# Patient Record
Sex: FEMALE | Race: WHITE | NOT HISPANIC OR LATINO | Employment: FULL TIME | URBAN - METROPOLITAN AREA
[De-identification: names, ages, dates, MRNs, and addresses within clinical notes are randomized per-mention and may not be internally consistent; named-entity substitution may affect disease eponyms.]

---

## 2023-08-17 ENCOUNTER — OFFICE VISIT (OUTPATIENT)
Dept: PRIMARY CARE CLINIC | Facility: CLINIC | Age: 48
End: 2023-08-17
Payer: OTHER GOVERNMENT

## 2023-08-17 VITALS
HEIGHT: 67 IN | SYSTOLIC BLOOD PRESSURE: 119 MMHG | DIASTOLIC BLOOD PRESSURE: 81 MMHG | WEIGHT: 244 LBS | HEART RATE: 75 BPM | OXYGEN SATURATION: 97 % | BODY MASS INDEX: 38.3 KG/M2 | RESPIRATION RATE: 18 BRPM

## 2023-08-17 DIAGNOSIS — N94.10 DYSPAREUNIA, FEMALE: Primary | ICD-10-CM

## 2023-08-17 DIAGNOSIS — N95.2 ATROPHIC VAGINITIS: ICD-10-CM

## 2023-08-17 PROCEDURE — 99203 OFFICE O/P NEW LOW 30 MIN: CPT | Mod: ,,, | Performed by: STUDENT IN AN ORGANIZED HEALTH CARE EDUCATION/TRAINING PROGRAM

## 2023-08-17 PROCEDURE — 99203 PR OFFICE/OUTPT VISIT, NEW, LEVL III, 30-44 MIN: ICD-10-PCS | Mod: ,,, | Performed by: STUDENT IN AN ORGANIZED HEALTH CARE EDUCATION/TRAINING PROGRAM

## 2023-08-17 RX ORDER — IBUPROFEN 800 MG/1
800 TABLET ORAL 2 TIMES DAILY PRN
COMMUNITY
Start: 2023-08-14

## 2023-08-17 RX ORDER — HYDROCHLOROTHIAZIDE 25 MG/1
25 TABLET ORAL DAILY
COMMUNITY

## 2023-08-17 RX ORDER — LISINOPRIL 40 MG/1
1 TABLET ORAL DAILY
COMMUNITY
Start: 2023-08-14

## 2023-08-17 RX ORDER — CETIRIZINE HYDROCHLORIDE 10 MG/1
10 TABLET ORAL DAILY
COMMUNITY

## 2023-08-17 RX ORDER — MONTELUKAST SODIUM 10 MG/1
10 TABLET ORAL NIGHTLY
COMMUNITY
End: 2023-10-31 | Stop reason: ALTCHOICE

## 2023-08-17 RX ORDER — EZETIMIBE 10 MG/1
10 TABLET ORAL DAILY
COMMUNITY
Start: 2023-08-14 | End: 2023-08-17 | Stop reason: ALTCHOICE

## 2023-08-17 RX ORDER — ASPIRIN 81 MG/1
81 TABLET ORAL DAILY
COMMUNITY

## 2023-08-17 RX ORDER — ESCITALOPRAM OXALATE 10 MG/1
10 TABLET ORAL DAILY
COMMUNITY
Start: 2023-08-14

## 2023-08-17 RX ORDER — CHOLECALCIFEROL (VITAMIN D3) 25 MCG
TABLET ORAL DAILY
COMMUNITY
Start: 2023-08-14

## 2023-08-17 RX ORDER — FENOFIBRATE 48 MG/1
48 TABLET, FILM COATED ORAL DAILY
COMMUNITY

## 2023-08-17 RX ORDER — ESTRADIOL 0.1 MG/G
CREAM VAGINAL
Qty: 42.5 G | Refills: 11 | Status: SHIPPED | OUTPATIENT
Start: 2023-08-17 | End: 2023-10-31 | Stop reason: SDUPTHER

## 2023-08-17 RX ORDER — PRAVASTATIN SODIUM 80 MG/1
40 TABLET ORAL NIGHTLY
COMMUNITY
Start: 2023-08-14

## 2023-08-17 NOTE — PROGRESS NOTES
Chief Complaint  Chief Complaint   Patient presents with    [facility]     [facility], would like a local MD     Dyspareunia     Has vaginal dryness, has tried lubrication without relief of pain, S/P hysterectomy for endometriosis, would like to discuss referral to GYN       HPI  Sandy Martinez is a 48 y.o. female with medical diagnoses as listed in the medical history and problem list that presents to [facility].   Histories reviewed. [facility].      and has 15 year old son   She just moved from [place] in 12/2022  Patient complains of worsening pain with sexual intercourse since total hysterectomy in 2018. She has not had intercourse x 7 months. Denies history of breast or uterine or cervical cancer. Patient uses lubricant as much as possible but it has not helped. (+) significant dryness and itch of the vaginal and vulvar area. Denies abnormal bleeding or discharge. Denies any lesions.   Health Maintenance         Date Due Completion Date    Hepatitis C Screening Never done ---    HIV Screening Never done ---    Hemoglobin A1c (Diabetic Prevention Screening) Never done ---    Colorectal Cancer Screening 12/01/2023 (Originally 1975) ---    Mammogram 12/01/2023 (Originally 5/18/1993) ---    Influenza Vaccine (1) 09/01/2023 ---    TETANUS VACCINE 04/14/2033 4/14/2023            ALLERGIES AND MEDICATIONS: updated and reviewed.  Review of patient's allergies indicates:  No Known Allergies  Current Outpatient Medications   Medication Sig Dispense Refill    aspirin (ECOTRIN) 81 MG EC tablet Take 81 mg by mouth once daily.      cetirizine (ZYRTEC) 10 MG tablet Take 10 mg by mouth once daily.      EScitalopram oxalate (LEXAPRO) 10 MG tablet Take 10 mg by mouth once daily.      fenofibrate (TRICOR) 48 MG tablet Take 48 mg by mouth once daily.      hydroCHLOROthiazide (HYDRODIURIL) 25 MG tablet Take 25 mg by mouth once  "daily.      ibuprofen (ADVIL,MOTRIN) 800 MG tablet Take 800 mg by mouth 2 (two) times daily as needed.      lisinopriL (PRINIVIL,ZESTRIL) 40 MG tablet Take 1 tablet by mouth once daily.      montelukast (SINGULAIR) 10 mg tablet Take 10 mg by mouth every evening.      pravastatin (PRAVACHOL) 80 MG tablet Take 40 mg by mouth every evening.      vitamin D (VITAMIN D3) 1000 units Tab Take by mouth once daily.      estradioL (ESTRACE) 0.01 % (0.1 mg/gram) vaginal cream Use 2g daily x 2 weeks then 1g daily x 2 weeks. Then 1g twice weekly. 42.5 g 11     No current facility-administered medications for this visit.       Histories are reviewed and updated as appropriate     Review of Systems  Comprehensive review of system performed- negative except noted in HPI       Objective:   Vitals:    08/17/23 1338   BP: 119/81   Pulse: 75   Resp: 18   SpO2: 97%   Weight: 110.7 kg (244 lb)   Height: 5' 7" (1.702 m)    Body mass index is 38.22 kg/m².  Physical Exam  Vitals and nursing note reviewed.   Constitutional:       General: She is not in acute distress.     Appearance: Normal appearance.   HENT:      Head: Normocephalic and atraumatic.      Mouth/Throat:      Mouth: Mucous membranes are moist.      Pharynx: Oropharynx is clear.   Eyes:      Extraocular Movements: Extraocular movements intact.      Conjunctiva/sclera: Conjunctivae normal.   Cardiovascular:      Rate and Rhythm: Normal rate and regular rhythm.   Pulmonary:      Effort: Pulmonary effort is normal.      Breath sounds: Normal breath sounds.   Abdominal:      General: There is no distension.   Musculoskeletal:         General: Normal range of motion.   Skin:     General: Skin is warm and dry.   Neurological:      General: No focal deficit present.      Mental Status: She is alert and oriented to person, place, and time. Mental status is at baseline.   Psychiatric:         Mood and Affect: Mood normal.         Behavior: Behavior normal.         Thought Content: " Thought content normal.         Judgment: Judgment normal.           Assessment & Plan  1. Dyspareunia, female  -     estradioL (ESTRACE) 0.01 % (0.1 mg/gram) vaginal cream; Use 2g daily x 2 weeks then 1g daily x 2 weeks. Then 1g twice weekly.  Dispense: 42.5 g; Refill: 11    2. Atrophic vaginitis  -     estradioL (ESTRACE) 0.01 % (0.1 mg/gram) vaginal cream; Use 2g daily x 2 weeks then 1g daily x 2 weeks. Then 1g twice weekly.  Dispense: 42.5 g; Refill: 11    Trial of topical estrogen cream at this time. Side effects discussed with patient.  Discuss the need of GYN referral if patient needs further treatment.,   Awaiting for records from prior PCP.

## 2023-09-13 ENCOUNTER — PATIENT MESSAGE (OUTPATIENT)
Dept: PRIMARY CARE CLINIC | Facility: CLINIC | Age: 48
End: 2023-09-13
Payer: OTHER GOVERNMENT

## 2023-09-13 ENCOUNTER — TELEPHONE (OUTPATIENT)
Dept: PRIMARY CARE CLINIC | Facility: CLINIC | Age: 48
End: 2023-09-13
Payer: OTHER GOVERNMENT

## 2023-09-13 NOTE — TELEPHONE ENCOUNTER
----- Message from Jyothi Combs sent at 9/13/2023  9:21 AM CDT -----  Regarding: Referral  .Type:  Patient Requesting Referral    Who Called:Joycelyn Louie   Does the patient already have the specialty appointment scheduled?:no  If yes, what is the date of that appointment?:  Referral to What Specialty:  Reason for Referral:  Does the patient want the referral with a specific physician?:  Is the specialist an [facility]?:  Patient Requesting a Response?:  Would the patient rather a call back or a response via [facility]?   Best Call Back Number:[phone]  Additional Information: Unable to contact pt

## 2023-09-13 NOTE — TELEPHONE ENCOUNTER
Noted, referral management updated. I also sent patient a message via portal with phone number to [facility].

## 2023-10-31 ENCOUNTER — OFFICE VISIT (OUTPATIENT)
Dept: PRIMARY CARE CLINIC | Facility: CLINIC | Age: 48
End: 2023-10-31
Payer: OTHER GOVERNMENT

## 2023-10-31 VITALS
BODY MASS INDEX: 38.45 KG/M2 | TEMPERATURE: 98 F | OXYGEN SATURATION: 97 % | RESPIRATION RATE: 18 BRPM | DIASTOLIC BLOOD PRESSURE: 84 MMHG | WEIGHT: 245 LBS | HEIGHT: 67 IN | SYSTOLIC BLOOD PRESSURE: 130 MMHG | HEART RATE: 81 BPM

## 2023-10-31 DIAGNOSIS — R50.9 FEVER, UNSPECIFIED FEVER CAUSE: ICD-10-CM

## 2023-10-31 DIAGNOSIS — R52 BODY ACHES: ICD-10-CM

## 2023-10-31 DIAGNOSIS — N95.2 ATROPHIC VAGINITIS: ICD-10-CM

## 2023-10-31 DIAGNOSIS — J06.9 UPPER RESPIRATORY TRACT INFECTION, UNSPECIFIED TYPE: Primary | ICD-10-CM

## 2023-10-31 DIAGNOSIS — J02.9 SORETHROAT: ICD-10-CM

## 2023-10-31 DIAGNOSIS — N94.10 DYSPAREUNIA, FEMALE: ICD-10-CM

## 2023-10-31 LAB
CTP QC/QA: YES
CTP QC/QA: YES
POC MOLECULAR INFLUENZA A AGN: NEGATIVE
POC MOLECULAR INFLUENZA B AGN: NEGATIVE
S PYO RRNA THROAT QL PROBE: NEGATIVE

## 2023-10-31 PROCEDURE — 87502 POCT INFLUENZA A/B MOLECULAR: ICD-10-PCS | Mod: QW,,, | Performed by: STUDENT IN AN ORGANIZED HEALTH CARE EDUCATION/TRAINING PROGRAM

## 2023-10-31 PROCEDURE — 99214 PR OFFICE/OUTPT VISIT, EST, LEVL IV, 30-39 MIN: ICD-10-PCS | Mod: 25,,, | Performed by: STUDENT IN AN ORGANIZED HEALTH CARE EDUCATION/TRAINING PROGRAM

## 2023-10-31 PROCEDURE — 96372 THER/PROPH/DIAG INJ SC/IM: CPT | Mod: ,,, | Performed by: STUDENT IN AN ORGANIZED HEALTH CARE EDUCATION/TRAINING PROGRAM

## 2023-10-31 PROCEDURE — 87880 STREP A ASSAY W/OPTIC: CPT | Mod: QW,,, | Performed by: STUDENT IN AN ORGANIZED HEALTH CARE EDUCATION/TRAINING PROGRAM

## 2023-10-31 PROCEDURE — 99214 OFFICE O/P EST MOD 30 MIN: CPT | Mod: 25,,, | Performed by: STUDENT IN AN ORGANIZED HEALTH CARE EDUCATION/TRAINING PROGRAM

## 2023-10-31 PROCEDURE — 87502 INFLUENZA DNA AMP PROBE: CPT | Mod: QW,,, | Performed by: STUDENT IN AN ORGANIZED HEALTH CARE EDUCATION/TRAINING PROGRAM

## 2023-10-31 PROCEDURE — 96372 PR INJECTION,THERAP/PROPH/DIAG2ST, IM OR SUBCUT: ICD-10-PCS | Mod: ,,, | Performed by: STUDENT IN AN ORGANIZED HEALTH CARE EDUCATION/TRAINING PROGRAM

## 2023-10-31 PROCEDURE — 87880 POCT RAPID STREP A: ICD-10-PCS | Mod: QW,,, | Performed by: STUDENT IN AN ORGANIZED HEALTH CARE EDUCATION/TRAINING PROGRAM

## 2023-10-31 RX ORDER — BETAMETHASONE SODIUM PHOSPHATE AND BETAMETHASONE ACETATE 3; 3 MG/ML; MG/ML
9 INJECTION, SUSPENSION INTRA-ARTICULAR; INTRALESIONAL; INTRAMUSCULAR; SOFT TISSUE ONCE
Status: COMPLETED | OUTPATIENT
Start: 2023-10-31 | End: 2023-10-31

## 2023-10-31 RX ORDER — HYDROXYZINE HYDROCHLORIDE 10 MG/1
10 TABLET, FILM COATED ORAL NIGHTLY
COMMUNITY
Start: 2023-09-05 | End: 2023-10-31 | Stop reason: ALTCHOICE

## 2023-10-31 RX ORDER — ESTRADIOL 0.1 MG/G
CREAM VAGINAL
Qty: 42.5 G | Refills: 11 | Status: SHIPPED | OUTPATIENT
Start: 2023-10-31

## 2023-10-31 RX ADMIN — BETAMETHASONE SODIUM PHOSPHATE AND BETAMETHASONE ACETATE 9 MG: 3; 3 INJECTION, SUSPENSION INTRA-ARTICULAR; INTRALESIONAL; INTRAMUSCULAR; SOFT TISSUE at 11:10

## 2023-10-31 NOTE — PROGRESS NOTES
Chief Complaint  Chief Complaint   Patient presents with    Otalgia    Sore Throat    Cough       HPI  Sandy Martinez is a 48 y.o. female with medical diagnoses as listed in the medical history and problem list that presents for severe sinus congestion and ear congestion x 4 days. (+) sore throat, dry cough and sinus drip x 2 days.  Patient has not slept well since. (+) fever at 101F last night with chill. Denies sick contact. Appetite is normal.    Also requests refills for Estrace     Health Maintenance         Date Due Completion Date    Hepatitis C Screening Never done ---    HIV Screening Never done ---    Hemoglobin A1c (Diabetic Prevention Screening) Never done ---    Influenza Vaccine (1) Never done ---    Colorectal Cancer Screening 12/01/2023 (Originally 1975) ---    Mammogram 12/01/2023 (Originally 5/18/1993) ---    TETANUS VACCINE 04/14/2033 4/14/2023            ALLERGIES AND MEDICATIONS: updated and reviewed.  Review of patient's allergies indicates:  No Known Allergies  Current Outpatient Medications   Medication Sig Dispense Refill    aspirin (ECOTRIN) 81 MG EC tablet Take 81 mg by mouth once daily.      cetirizine (ZYRTEC) 10 MG tablet Take 10 mg by mouth once daily.      EScitalopram oxalate (LEXAPRO) 10 MG tablet Take 10 mg by mouth once daily.      fenofibrate (TRICOR) 48 MG tablet Take 48 mg by mouth once daily.      hydroCHLOROthiazide (HYDRODIURIL) 25 MG tablet Take 25 mg by mouth once daily.      ibuprofen (ADVIL,MOTRIN) 800 MG tablet Take 800 mg by mouth 2 (two) times daily as needed.      lisinopriL (PRINIVIL,ZESTRIL) 40 MG tablet Take 1 tablet by mouth once daily.      pravastatin (PRAVACHOL) 80 MG tablet Take 40 mg by mouth every evening.      vitamin D (VITAMIN D3) 1000 units Tab Take by mouth once daily.      estradioL (ESTRACE) 0.01 % (0.1 mg/gram) vaginal cream Use 2g daily x 2 weeks then 1g daily x 2 weeks. Then 1g twice weekly. 42.5 g 11     Current Facility-Administered  "Medications   Medication Dose Route Frequency Provider Last Rate Last Admin    betamethasone acetate-betamethasone sodium phosphate injection 9 mg  9 mg Intramuscular Once Tosha Lobo MD           Histories are reviewed and updated as appropriate     Review of Systems   HENT:  Positive for ear pain, rhinorrhea and sore throat. Negative for ear discharge and hearing loss.    Respiratory:  Positive for cough.    Gastrointestinal:  Negative for abdominal pain, diarrhea and vomiting.   Musculoskeletal:  Negative for neck pain.   Skin:  Negative for rash.   Neurological:  Positive for headaches.     Answers submitted by the patient for this visit:  Ear Pain Questionnaire (Submitted on 10/30/2023)  Chief Complaint: Ear pain  Affected ear: left  Chronicity: new  Onset: yesterday  Progression since onset: gradually worsening  Frequency: constantly  Fever: no fever  Pain - numeric: 3/10  drainage: No  Treatments tried: acetaminophen  Improvement on treatment: no relief  Pain severity: mild  chronic ear infection: No  hearing loss: No  tympanostomy tube: No      Objective:   Vitals:    10/31/23 0956   BP: 130/84   BP Location: Left arm   Patient Position: Sitting   BP Method: Large (Automatic)   Pulse: 81   Resp: 18   Temp: 98.4 °F (36.9 °C)   TempSrc: Oral   SpO2: 97%   Weight: 111.1 kg (245 lb)   Height: 5' 7" (1.702 m)    Body mass index is 38.37 kg/m².  Physical Exam  Vitals and nursing note reviewed.   Constitutional:       General: She is not in acute distress.     Appearance: Normal appearance.   HENT:      Head: Normocephalic and atraumatic.      Right Ear: Tympanic membrane and ear canal normal.      Left Ear: Tympanic membrane and ear canal normal.      Nose: Congestion and rhinorrhea present.      Mouth/Throat:      Mouth: Mucous membranes are moist.      Pharynx: No oropharyngeal exudate or posterior oropharyngeal erythema.   Eyes:      Extraocular Movements: Extraocular movements intact.      " Conjunctiva/sclera: Conjunctivae normal.   Cardiovascular:      Rate and Rhythm: Normal rate.   Pulmonary:      Effort: Pulmonary effort is normal.   Musculoskeletal:         General: Normal range of motion.      Cervical back: Normal range of motion.   Skin:     General: Skin is warm and dry.   Neurological:      General: No focal deficit present.      Mental Status: She is alert and oriented to person, place, and time. Mental status is at baseline.   Psychiatric:         Mood and Affect: Mood normal.         Behavior: Behavior normal.         Thought Content: Thought content normal.         Judgment: Judgment normal.           Assessment & Plan  1. Upper respiratory tract infection, unspecified type  -     betamethasone acetate-betamethasone sodium phosphate injection 9 mg. Side effects discussed with patient.  -    Continue with Zyrtec and steroidal nasal spray like Flonase or Nasacort   -   Stay hydrated   -    Contact clinic if fever persists     2. Dyspareunia, female  -    Refill  estradioL (ESTRACE) 0.01 % (0.1 mg/gram) vaginal cream; Use 2g daily x 2 weeks then 1g daily x 2 weeks. Then 1g twice weekly.  Dispense: 42.5 g; Refill: 11    3. Fever, unspecified fever cause  -     POCT rapid strep A neg  -     POCT Influenza A/B Molecular neg    Follow up if symptoms worsen or fail to improve.

## 2023-11-06 ENCOUNTER — TELEPHONE (OUTPATIENT)
Dept: PRIMARY CARE CLINIC | Facility: CLINIC | Age: 48
End: 2023-11-06
Payer: OTHER GOVERNMENT

## 2023-11-06 RX ORDER — AZITHROMYCIN 250 MG/1
TABLET, FILM COATED ORAL
Qty: 6 TABLET | Refills: 0 | Status: SHIPPED | OUTPATIENT
Start: 2023-11-06 | End: 2023-11-11

## 2023-11-06 NOTE — TELEPHONE ENCOUNTER
----- Message from Anita Brown sent at 11/6/2023  7:52 AM CST -----  Regarding: med advice  .Type:  Needs Medical Advice    Who Called: Pt  Symptoms (please be specific):    How long has patient had these symptoms:    Pharmacy name and phone #: [id number] - [facility] [address] AT [facility]   Would the patient rather a call back or a response via [facility]? Call back  Best Call Back Number: [phone]  Additional Information: Pt states she was there to see Dr Lobo on last week and states she's still not feeling well, was told to call clinic back.

## 2024-10-16 ENCOUNTER — PATIENT MESSAGE (OUTPATIENT)
Dept: PRIMARY CARE CLINIC | Facility: CLINIC | Age: 49
End: 2024-10-16
Payer: OTHER GOVERNMENT

## 2025-05-14 ENCOUNTER — PATIENT MESSAGE (OUTPATIENT)
Dept: PRIMARY CARE CLINIC | Facility: CLINIC | Age: 50
End: 2025-05-14

## 2025-05-14 DIAGNOSIS — Z12.31 OTHER SCREENING MAMMOGRAM: ICD-10-CM

## 2025-06-02 ENCOUNTER — PATIENT OUTREACH (OUTPATIENT)
Facility: CLINIC | Age: 50
End: 2025-06-02

## 2025-07-31 ENCOUNTER — PATIENT MESSAGE (OUTPATIENT)
Facility: CLINIC | Age: 50
End: 2025-07-31